# Patient Record
Sex: FEMALE | Race: WHITE | NOT HISPANIC OR LATINO
[De-identification: names, ages, dates, MRNs, and addresses within clinical notes are randomized per-mention and may not be internally consistent; named-entity substitution may affect disease eponyms.]

---

## 2023-07-12 ENCOUNTER — APPOINTMENT (OUTPATIENT)
Dept: OPHTHALMOLOGY | Facility: CLINIC | Age: 83
End: 2023-07-12
Payer: MEDICARE

## 2023-07-12 ENCOUNTER — NON-APPOINTMENT (OUTPATIENT)
Age: 83
End: 2023-07-12

## 2023-07-12 PROCEDURE — 92004 COMPRE OPH EXAM NEW PT 1/>: CPT

## 2023-07-12 PROCEDURE — 92250 FUNDUS PHOTOGRAPHY W/I&R: CPT

## 2023-07-12 PROCEDURE — 92025 CPTRIZED CORNEAL TOPOGRAPHY: CPT

## 2023-07-12 PROCEDURE — 92286 ANT SGM IMG I&R SPECLR MIC: CPT

## 2023-10-23 PROBLEM — Z00.00 ENCOUNTER FOR PREVENTIVE HEALTH EXAMINATION: Status: ACTIVE | Noted: 2023-10-23

## 2023-10-26 RX ORDER — SODIUM CHLORIDE 9 MG/ML
1000 INJECTION, SOLUTION INTRAVENOUS
Refills: 0 | Status: DISCONTINUED | OUTPATIENT
Start: 2023-10-30 | End: 2023-10-30

## 2023-10-26 RX ORDER — ACETAMINOPHEN 500 MG
650 TABLET ORAL EVERY 6 HOURS
Refills: 0 | Status: DISCONTINUED | OUTPATIENT
Start: 2023-10-30 | End: 2023-10-30

## 2023-10-27 NOTE — ASU PATIENT PROFILE, ADULT - NS PREOP UNDERSTANDS INFO
Informed pt about surgery time 1200, last meal 0000 no dairy, and last drink 0900 of water or apple juice 3 hrs before surgery. Bring photo ID and isurance card to the first floor. Must have an escort that is 18+. Leave jewelry, piercings, and contacts at home./yes

## 2023-10-27 NOTE — ASU PATIENT PROFILE, ADULT - NSICDXPASTMEDICALHX_GEN_ALL_CORE_FT
PAST MEDICAL HISTORY:  Aortic valve stenosis     High cholesterol     Neuropathy     Osteoarthritis     Raynaud disease

## 2023-10-30 ENCOUNTER — RESULT REVIEW (OUTPATIENT)
Age: 83
End: 2023-10-30

## 2023-10-30 ENCOUNTER — APPOINTMENT (OUTPATIENT)
Dept: OPHTHALMOLOGY | Facility: AMBULATORY SURGERY CENTER | Age: 83
End: 2023-10-30

## 2023-10-30 ENCOUNTER — TRANSCRIPTION ENCOUNTER (OUTPATIENT)
Age: 83
End: 2023-10-30

## 2023-10-30 ENCOUNTER — OUTPATIENT (OUTPATIENT)
Dept: OUTPATIENT SERVICES | Facility: HOSPITAL | Age: 83
LOS: 1 days | Discharge: HOME | End: 2023-10-30
Payer: MEDICARE

## 2023-10-30 VITALS
WEIGHT: 146.39 LBS | HEART RATE: 80 BPM | TEMPERATURE: 98 F | HEIGHT: 59 IN | RESPIRATION RATE: 17 BRPM | SYSTOLIC BLOOD PRESSURE: 144 MMHG | DIASTOLIC BLOOD PRESSURE: 64 MMHG | OXYGEN SATURATION: 97 %

## 2023-10-30 VITALS
DIASTOLIC BLOOD PRESSURE: 64 MMHG | TEMPERATURE: 98 F | OXYGEN SATURATION: 96 % | SYSTOLIC BLOOD PRESSURE: 139 MMHG | RESPIRATION RATE: 16 BRPM | HEART RATE: 91 BPM

## 2023-10-30 DIAGNOSIS — Z98.890 OTHER SPECIFIED POSTPROCEDURAL STATES: Chronic | ICD-10-CM

## 2023-10-30 LAB
GRAM STN FLD: SIGNIFICANT CHANGE UP
GRAM STN FLD: SIGNIFICANT CHANGE UP
SPECIMEN SOURCE: SIGNIFICANT CHANGE UP
SPECIMEN SOURCE: SIGNIFICANT CHANGE UP

## 2023-10-30 PROCEDURE — 65756 CORNEAL TRNSPL ENDOTHELIAL: CPT | Mod: LT

## 2023-10-30 PROCEDURE — 88304 TISSUE EXAM BY PATHOLOGIST: CPT | Mod: 26

## 2023-10-30 RX ORDER — ATORVASTATIN CALCIUM 80 MG/1
1 TABLET, FILM COATED ORAL
Refills: 0 | DISCHARGE

## 2023-10-30 RX ORDER — OFLOXACIN 0.3 %
1 DROPS OPHTHALMIC (EYE)
Refills: 0 | Status: COMPLETED | OUTPATIENT
Start: 2023-10-30 | End: 2023-10-30

## 2023-10-30 RX ORDER — CHOLECALCIFEROL (VITAMIN D3) 125 MCG
1 CAPSULE ORAL
Refills: 0 | DISCHARGE

## 2023-10-30 RX ORDER — ACETAMINOPHEN 500 MG
650 TABLET ORAL ONCE
Refills: 0 | Status: DISCONTINUED | OUTPATIENT
Start: 2023-10-30 | End: 2023-10-30

## 2023-10-30 RX ADMIN — Medication 1 DROP(S): at 11:48

## 2023-10-30 RX ADMIN — Medication 1 DROP(S): at 12:03

## 2023-10-30 RX ADMIN — Medication 1 DROP(S): at 11:55

## 2023-10-30 NOTE — OPERATIVE REPORT - OPERATIVE RPOSRT DETAILS
SURGEON: Tristin Beauchamp MD    ASSISTANT: Aleah Gardner MD    DATE: 10/30/23    PREOPERATIVE DIAGNOSIS:  Failed graft, Left Eye    POSTOPERATIVE DIAGNOSIS: Failed graft, Left Eye    OPERATIVE PROCEDURE:  Descemet Stripping Automated Endothelial Keratoplasty Left Eye    IMPLANT: Donor # [[id number]], Graft size [8.0 mm]      PROCEDURE:  The patient was brought into the operating room and placed supine on the operating room table. After uneventful induction of neuroleptic anesthesia, a retrobulbar block consisting of 5-7 cc of 2% lidocaine and 0.75% marcaine was administered around the left eye. A modified van Lint style lid block was also given. The patient was then prepped and draped in the usual sterile fashion. A wire lid speculum was inserted into the operative eye giving a wide palpebral fissure.    Using Castroviejo calipers it was determined that the chosen donor size would adequately cover the posterior corneal surface. A roderick knife was used to perform appropriate paracenteses through the clear cornea at the temporal and nasal oblique meridians. An anterior chamber maintainer was passed through the inferonasal paracentesis and balanced salt solution was infused to maintain the anterior chamber. Attention was then addressed to the precut donor tissue. The donor was cut to the appropriate size using a punch block and trephine, and the lenticule was covered with storage medium and set aside for later use.    Attention was then readdressed to the recipient cornea. A reverse Sinsky hook was used to score the prior DSAEK lenticule. A keratome was used to create a clear corneal incision just inside the temporal limbus. A Descemet's stripping instrument was used to remove the prior graft from the scored area. The keratome was then used to widen the temporal corneal wound to approximately 4.5 mm. The donor lenticule was then brought into the surgical field and loaded into a miniBusin glide  which was placed into the corneal wound. Tan endoforceps were then passed through a paracentesis across the anterior chamber to the temporal corneal wound grasping the donor lenticule and advancing it into the anterior chamber. The donor lenticule spontaneously unfurled endothelial side down.    The anterior chamber maintainer was removed and the paracentesis and corneal wound were closed with 10-0 nylon sutures. All the knots were then buried.  A 30 gauge cannula was placed on a 5 cc syringe filled with air and then passed through a paracentesis under the donor lenticule filling the anterior chamber with air to tamponade the lenticule against the inner surface of the cornea.  Topical irrigation of 4 mg dexamethasone and 100 mg cephazolin was administered inferiorly in the conjunctival cul de sac. The lid speculum was removed from the eye and a shield and dressing placed over the eye.  The patient tolerated the procedure well and was to remain in a supine position in the recovery area for approximately 45 minutes before discharge

## 2023-10-30 NOTE — ASU DISCHARGE PLAN (ADULT/PEDIATRIC) - "IF YOU OR YOUR GUARDIAN/FAMILY IS A SMOKER, IT IS IMPORTANT FOR YOUR HEALTH TO STOP SMOKING. PLEASE BE AWARE THAT SECOND HAND SMOKE IS ALSO HARMFUL."
Occupational Therapy Treatment Note:         08/27/19 1825   Restrictions/Precautions   Weight Bearing Precautions Per Order No   Other Precautions Fall Risk;Pain; Chair Alarm; Bed Alarm;Cognitive;Visual impairment;Spinal precautions   Pain Assessment   Pain Assessment 0-10   Pain Score Worst Possible Pain   Pain Type Chronic pain   Pain Location Generalized   Pain Orientation Bilateral   ADL   Where Assessed Edge of bed   Functional Standing Tolerance   Comments unable to assess  Cognition   Overall Cognitive Status Impaired   Arousal/Participation Responsive   Attention Difficulty attending to directions   Orientation Level Oriented to person   Memory Decreased recall of recent events;Decreased recall of precautions;Decreased short term memory   Following Commands Follows one step commands without difficulty   Comments Pt with increased agitation, tremors and rapid speech noted this tx session  Nursing staff reports increased confusion and resistive behaviors noted  Additional Activities   Additional Activities Other (Comment)  (reviewed basic orientation)   Additional Activities Comments Reassurance and emotional supoprt required due to c/o of staff, "Ignoring" Pt Extended time provided for Pt self express  Activity Tolerance   Activity Tolerance Patient limited by pain; Patient limited by fatigue   Medical Staff Made Aware reported all findings to [facility]  Assessment   Assessment Pt was briefly seen for OT tx session  Increased confusion and pain noted this tx session  Pt with rapid speech noted with Pt seated EOB  Pt with many c/o of being unable to, "get a cup of coffee"  Attempted to redirect Pt regarding current plan of care, location and acknowledgement of his requests  Pt initially argumentitive but able to achieve relaxed positoning with soft voice following review of current plan of care  Educated Pt on need to prevent falls and ask for A with functional transfers as needed   Pt pleasant and agreeble with termination of tx session  Tremors and rapic speech slowed with temination of tx session  Reported findings to nursing staff  Will continue to pursue active engagement in tx sessions with decreased pain levels and improved communication  Plan   Treatment Interventions ADL retraining;Functional transfer training; Endurance training;Cognitive reorientation   Goal Expiration Date 09/06/19   Treatment Day 2   OT Frequency 3-5x/wk   Recommendation   OT Discharge Recommendation Short Term Rehab   Delmy Larsen Statement Selected

## 2023-10-30 NOTE — ASU PREOP CHECKLIST - SITE MARKED BY SURGEON
-- DO NOT REPLY / DO NOT REPLY ALL --  -- Message is from Engagement Center Operations ([facility]) --    General Patient Message: Pt is calling back because she is completely out of test strips. Patient switched to [facility] on [address] [place] [address]. The name of test strips/machine that will be covered by insurance is [employer].    Caller Information       Type Contact Phone/Fax    09/29/2023 02:15 PM CDT Phone (Incoming) [place] Watkins (Self) [phone] (M)    09/30/2023 11:26 AM CDT Phone (Incoming) [place] Watkins (Self) [phone] (M)        Alternative phone number: none    Can a detailed message be left? Yes    Message Turnaround:     Is it Working Hours? No - After Hours/Weekend/Holiday     Did the caller agree that this message can wait until the office reopens in the morning? YES - The Message Can Wait - Send a message to the provider's clinical support pool     [place]:    Please give this turnaround time to the caller:   \"This message will be sent to [state Provider's name]. The clinical team will fulfill your request as soon as they review your message.\"                 yes

## 2023-10-30 NOTE — PACU DISCHARGE NOTE - AIRWAY PATENCY:

## 2023-10-31 ENCOUNTER — NON-APPOINTMENT (OUTPATIENT)
Age: 83
End: 2023-10-31

## 2023-10-31 ENCOUNTER — APPOINTMENT (OUTPATIENT)
Dept: OPHTHALMOLOGY | Facility: CLINIC | Age: 83
End: 2023-10-31
Payer: MEDICARE

## 2023-10-31 PROCEDURE — 99024 POSTOP FOLLOW-UP VISIT: CPT

## 2023-11-07 ENCOUNTER — NON-APPOINTMENT (OUTPATIENT)
Age: 83
End: 2023-11-07

## 2023-11-07 ENCOUNTER — APPOINTMENT (OUTPATIENT)
Dept: OPHTHALMOLOGY | Facility: CLINIC | Age: 83
End: 2023-11-07
Payer: MEDICARE

## 2023-11-07 PROBLEM — I35.0 NONRHEUMATIC AORTIC (VALVE) STENOSIS: Chronic | Status: ACTIVE | Noted: 2023-10-30

## 2023-11-07 PROBLEM — G62.9 POLYNEUROPATHY, UNSPECIFIED: Chronic | Status: ACTIVE | Noted: 2023-10-30

## 2023-11-07 PROBLEM — I73.00 RAYNAUD'S SYNDROME WITHOUT GANGRENE: Chronic | Status: ACTIVE | Noted: 2023-10-30

## 2023-11-07 PROBLEM — M19.90 UNSPECIFIED OSTEOARTHRITIS, UNSPECIFIED SITE: Chronic | Status: ACTIVE | Noted: 2023-10-30

## 2023-11-07 PROBLEM — E78.00 PURE HYPERCHOLESTEROLEMIA, UNSPECIFIED: Chronic | Status: ACTIVE | Noted: 2023-10-30

## 2023-11-07 PROCEDURE — 99024 POSTOP FOLLOW-UP VISIT: CPT

## 2023-11-08 LAB
SURGICAL PATHOLOGY STUDY: SIGNIFICANT CHANGE UP
SURGICAL PATHOLOGY STUDY: SIGNIFICANT CHANGE UP

## 2023-11-20 LAB
CULTURE RESULTS: NO GROWTH — SIGNIFICANT CHANGE UP
CULTURE RESULTS: NO GROWTH — SIGNIFICANT CHANGE UP
SPECIMEN SOURCE: SIGNIFICANT CHANGE UP
SPECIMEN SOURCE: SIGNIFICANT CHANGE UP

## 2023-11-29 ENCOUNTER — APPOINTMENT (OUTPATIENT)
Dept: OPHTHALMOLOGY | Facility: CLINIC | Age: 83
End: 2023-11-29
Payer: MEDICARE

## 2023-11-29 ENCOUNTER — NON-APPOINTMENT (OUTPATIENT)
Age: 83
End: 2023-11-29

## 2023-11-29 PROCEDURE — 99024 POSTOP FOLLOW-UP VISIT: CPT

## 2024-01-09 ENCOUNTER — APPOINTMENT (OUTPATIENT)
Dept: OPHTHALMOLOGY | Facility: CLINIC | Age: 84
End: 2024-01-09
Payer: MEDICARE

## 2024-01-09 ENCOUNTER — NON-APPOINTMENT (OUTPATIENT)
Age: 84
End: 2024-01-09

## 2024-01-09 PROCEDURE — 99024 POSTOP FOLLOW-UP VISIT: CPT

## 2024-03-05 ENCOUNTER — NON-APPOINTMENT (OUTPATIENT)
Age: 84
End: 2024-03-05

## 2024-03-05 ENCOUNTER — APPOINTMENT (OUTPATIENT)
Dept: OPHTHALMOLOGY | Facility: CLINIC | Age: 84
End: 2024-03-05
Payer: MEDICARE

## 2024-03-05 PROCEDURE — 92012 INTRM OPH EXAM EST PATIENT: CPT

## 2024-04-09 ENCOUNTER — NON-APPOINTMENT (OUTPATIENT)
Age: 84
End: 2024-04-09

## 2024-04-09 ENCOUNTER — APPOINTMENT (OUTPATIENT)
Dept: OPHTHALMOLOGY | Facility: CLINIC | Age: 84
End: 2024-04-09
Payer: MEDICARE

## 2024-04-09 PROCEDURE — 92012 INTRM OPH EXAM EST PATIENT: CPT

## 2024-10-08 ENCOUNTER — APPOINTMENT (OUTPATIENT)
Dept: OPHTHALMOLOGY | Facility: CLINIC | Age: 84
End: 2024-10-08

## 2025-06-27 ENCOUNTER — APPOINTMENT (OUTPATIENT)
Dept: OPHTHALMOLOGY | Facility: CLINIC | Age: 85
End: 2025-06-27
Payer: MEDICARE

## 2025-06-27 ENCOUNTER — NON-APPOINTMENT (OUTPATIENT)
Age: 85
End: 2025-06-27

## 2025-06-27 PROCEDURE — 92286 ANT SGM IMG I&R SPECLR MIC: CPT

## 2025-06-27 PROCEDURE — 92012 INTRM OPH EXAM EST PATIENT: CPT

## 2025-06-27 PROCEDURE — 92025 CPTRIZED CORNEAL TOPOGRAPHY: CPT

## (undated) DEVICE — NDL HYPO SAFE 18G X 1.5" (PINK)

## (undated) DEVICE — SOL IRR BAL SALT 500ML

## (undated) DEVICE — STOPCOCK 4-WAY

## (undated) DEVICE — VENODYNE/SCD SLEEVE CALF MEDIUM

## (undated) DEVICE — NDL HYPO SAFE 25G X 5/8" (ORANGE)

## (undated) DEVICE — PREP BETADINE 5% STERILE OPTHALMIC SOLUTION

## (undated) DEVICE — CANNULA IRR AC CHAMBER 8MM BEND

## (undated) DEVICE — KNIFE FULL HANDLE ANGLE 2.75MM

## (undated) DEVICE — FILTER SYR 22 MICRONS

## (undated) DEVICE — GLV 7.5 PROTEXIS (WHITE)

## (undated) DEVICE — PUNCH TREPH DISP STRL 8MM

## (undated) DEVICE — KNIFE ALCON STANDARD FULL HANDLE 15 DEG (PINK)

## (undated) DEVICE — SUT NYLON 10-0 12" CU-5

## (undated) DEVICE — PACK ANTERIOR SEGMENT

## (undated) DEVICE — Device

## (undated) DEVICE — DRSG TAPE TRANSPORE 1"

## (undated) DEVICE — CENTURION PAK FACO ACTIVE INTREPID FMS 0.9 MM ULTRA

## (undated) DEVICE — SYR LUER LOK 3CC

## (undated) DEVICE — SPEAR CELLULOSE 40410

## (undated) DEVICE — GOWN ROYAL SILK XL

## (undated) DEVICE — SYR LUER LOK 10CC

## (undated) DEVICE — DRAPE MICROSCOPE KNOB COVER SMALL (2 PCS)

## (undated) DEVICE — CULTURESWAB WITHOUT CHARCOAL

## (undated) DEVICE — ACM SELF RETAINING 20G

## (undated) DEVICE — DRAPE MEDIUM SHEET 44" X 70"

## (undated) DEVICE — SYR LUER LOK 5CC

## (undated) DEVICE — PETRI DISH MED 3.5"

## (undated) DEVICE — WARMING BLANKET LOWER ADULT

## (undated) DEVICE — NDL RETROBULBAR VISITEC 25X1.5

## (undated) DEVICE — NDL HYPO NONSAFE 30G X 0.5" (BEIGE)

## (undated) DEVICE — TUBING ALARIS PUMP MODULE NON-DEHP

## (undated) DEVICE — MARKING PEN DEVON DUAL TIP W RULER